# Patient Record
Sex: MALE | Race: WHITE | Employment: FULL TIME | URBAN - NONMETROPOLITAN AREA
[De-identification: names, ages, dates, MRNs, and addresses within clinical notes are randomized per-mention and may not be internally consistent; named-entity substitution may affect disease eponyms.]

---
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
## 2022-05-08 ENCOUNTER — HOSPITAL ENCOUNTER (EMERGENCY)
Age: 22
Discharge: HOME | End: 2022-05-08
Payer: COMMERCIAL

## 2022-05-08 ENCOUNTER — APPOINTMENT (OUTPATIENT)
Dept: CT IMAGING | Age: 22
End: 2022-05-08
Payer: COMMERCIAL

## 2022-05-08 VITALS
DIASTOLIC BLOOD PRESSURE: 76 MMHG | HEART RATE: 58 BPM | OXYGEN SATURATION: 99 % | SYSTOLIC BLOOD PRESSURE: 129 MMHG | WEIGHT: 220 LBS | HEIGHT: 77 IN | TEMPERATURE: 97.7 F | RESPIRATION RATE: 23 BRPM | BODY MASS INDEX: 25.98 KG/M2

## 2022-05-08 DIAGNOSIS — R56.9 SEIZURE (HCC): Primary | ICD-10-CM

## 2022-05-08 LAB
AMPHETAMINE+METHAMPHETAMINE URINE SCREEN: NEGATIVE
ANION GAP SERPL CALCULATED.3IONS-SCNC: 13 MEQ/L (ref 8–16)
BACTERIA: ABNORMAL /HPF
BARBITURATE QUANTITATIVE URINE: NEGATIVE
BASOPHILS # BLD: 0.4 %
BASOPHILS ABSOLUTE: 0 THOU/MM3 (ref 0–0.1)
BENZODIAZEPINE QUANTITATIVE URINE: NEGATIVE
BILIRUBIN URINE: NEGATIVE
BLOOD, URINE: NEGATIVE
BUN BLDV-MCNC: 25 MG/DL (ref 7–22)
CALCIUM SERPL-MCNC: 9.6 MG/DL (ref 8.5–10.5)
CANNABINOID QUANTITATIVE URINE: NEGATIVE
CASTS 2: ABNORMAL /LPF
CASTS UA: ABNORMAL /LPF
CHARACTER, URINE: CLEAR
CHLORIDE BLD-SCNC: 105 MEQ/L (ref 98–111)
CO2: 23 MEQ/L (ref 23–33)
COCAINE METABOLITE QUANTITATIVE URINE: NEGATIVE
COLOR: YELLOW
CREAT SERPL-MCNC: 0.9 MG/DL (ref 0.4–1.2)
CRYSTALS, UA: ABNORMAL
EKG ATRIAL RATE: 56 BPM
EKG P AXIS: 21 DEGREES
EKG P-R INTERVAL: 164 MS
EKG Q-T INTERVAL: 408 MS
EKG QRS DURATION: 104 MS
EKG QTC CALCULATION (BAZETT): 393 MS
EKG R AXIS: 64 DEGREES
EKG T AXIS: 17 DEGREES
EKG VENTRICULAR RATE: 56 BPM
EOSINOPHIL # BLD: 0.9 %
EOSINOPHILS ABSOLUTE: 0 THOU/MM3 (ref 0–0.4)
EPITHELIAL CELLS, UA: ABNORMAL /HPF
ERYTHROCYTE [DISTWIDTH] IN BLOOD BY AUTOMATED COUNT: 12.5 % (ref 11.5–14.5)
ERYTHROCYTE [DISTWIDTH] IN BLOOD BY AUTOMATED COUNT: 41.8 FL (ref 35–45)
ETHYL ALCOHOL, SERUM: < 0.01 %
GLUCOSE BLD-MCNC: 96 MG/DL (ref 70–108)
GLUCOSE URINE: NEGATIVE MG/DL
HCT VFR BLD CALC: 47.3 % (ref 42–52)
HEMOGLOBIN: 15.9 GM/DL (ref 14–18)
IMMATURE GRANS (ABS): 0.03 THOU/MM3 (ref 0–0.07)
IMMATURE GRANULOCYTES: 0.7 %
KETONES, URINE: NEGATIVE
LEUKOCYTE ESTERASE, URINE: NEGATIVE
LYMPHOCYTES # BLD: 46.3 %
LYMPHOCYTES ABSOLUTE: 2.1 THOU/MM3 (ref 1–4.8)
MAGNESIUM: 2 MG/DL (ref 1.6–2.4)
MCH RBC QN AUTO: 30.7 PG (ref 26–33)
MCHC RBC AUTO-ENTMCNC: 33.6 GM/DL (ref 32.2–35.5)
MCV RBC AUTO: 91.3 FL (ref 80–94)
MISCELLANEOUS 2: ABNORMAL
MONOCYTES # BLD: 7.7 %
MONOCYTES ABSOLUTE: 0.4 THOU/MM3 (ref 0.4–1.3)
NITRITE, URINE: NEGATIVE
NUCLEATED RED BLOOD CELLS: 0 /100 WBC
OPIATES, URINE: NEGATIVE
OSMOLALITY CALCULATION: 285.5 MOSMOL/KG (ref 275–300)
OXYCODONE: NEGATIVE
PH UA: 5.5 (ref 5–9)
PHENCYCLIDINE QUANTITATIVE URINE: NEGATIVE
PLATELET # BLD: 183 THOU/MM3 (ref 130–400)
PMV BLD AUTO: 11 FL (ref 9.4–12.4)
POTASSIUM SERPL-SCNC: 4.6 MEQ/L (ref 3.5–5.2)
PROLACTIN: 38.9 NG/ML
PROTEIN UA: ABNORMAL
RBC # BLD: 5.18 MILL/MM3 (ref 4.7–6.1)
RBC URINE: ABNORMAL /HPF
RENAL EPITHELIAL, UA: ABNORMAL
SEG NEUTROPHILS: 44 %
SEGMENTED NEUTROPHILS ABSOLUTE COUNT: 2 THOU/MM3 (ref 1.8–7.7)
SODIUM BLD-SCNC: 141 MEQ/L (ref 135–145)
SPECIFIC GRAVITY, URINE: 1.02 (ref 1–1.03)
TROPONIN T: < 0.01 NG/ML
UROBILINOGEN, URINE: 0.2 EU/DL (ref 0–1)
WBC # BLD: 4.6 THOU/MM3 (ref 4.8–10.8)
WBC UA: ABNORMAL /HPF
YEAST: ABNORMAL

## 2022-05-08 PROCEDURE — 84484 ASSAY OF TROPONIN QUANT: CPT

## 2022-05-08 PROCEDURE — 70450 CT HEAD/BRAIN W/O DYE: CPT

## 2022-05-08 PROCEDURE — 99284 EMERGENCY DEPT VISIT MOD MDM: CPT

## 2022-05-08 PROCEDURE — 80048 BASIC METABOLIC PNL TOTAL CA: CPT

## 2022-05-08 PROCEDURE — 85025 COMPLETE CBC W/AUTO DIFF WBC: CPT

## 2022-05-08 PROCEDURE — 93005 ELECTROCARDIOGRAM TRACING: CPT | Performed by: PHYSICIAN ASSISTANT

## 2022-05-08 PROCEDURE — 83735 ASSAY OF MAGNESIUM: CPT

## 2022-05-08 PROCEDURE — 93010 ELECTROCARDIOGRAM REPORT: CPT | Performed by: INTERNAL MEDICINE

## 2022-05-08 PROCEDURE — 84146 ASSAY OF PROLACTIN: CPT

## 2022-05-08 PROCEDURE — 81001 URINALYSIS AUTO W/SCOPE: CPT

## 2022-05-08 PROCEDURE — 2580000003 HC RX 258: Performed by: PHYSICIAN ASSISTANT

## 2022-05-08 PROCEDURE — 82077 ASSAY SPEC XCP UR&BREATH IA: CPT

## 2022-05-08 PROCEDURE — 80305 DRUG TEST PRSMV DIR OPT OBS: CPT

## 2022-05-08 RX ORDER — 0.9 % SODIUM CHLORIDE 0.9 %
1000 INTRAVENOUS SOLUTION INTRAVENOUS ONCE
Status: COMPLETED | OUTPATIENT
Start: 2022-05-08 | End: 2022-05-08

## 2022-05-08 RX ADMIN — SODIUM CHLORIDE 1000 ML: 9 INJECTION, SOLUTION INTRAVENOUS at 07:32

## 2022-05-08 ASSESSMENT — PAIN SCALES - GENERAL
PAINLEVEL_OUTOF10: 4

## 2022-05-08 ASSESSMENT — PAIN DESCRIPTION - LOCATION
LOCATION: SHOULDER

## 2022-05-08 ASSESSMENT — ENCOUNTER SYMPTOMS
DIARRHEA: 0
BLOOD IN STOOL: 0
COUGH: 0
SORE THROAT: 0
SHORTNESS OF BREATH: 0
EYE PAIN: 0
NAUSEA: 0
SINUS PAIN: 0
ABDOMINAL DISTENTION: 0
CONSTIPATION: 0
SINUS PRESSURE: 0
ABDOMINAL PAIN: 0
VOMITING: 0

## 2022-05-08 ASSESSMENT — PAIN - FUNCTIONAL ASSESSMENT
PAIN_FUNCTIONAL_ASSESSMENT: 0-10

## 2022-05-08 ASSESSMENT — PAIN DESCRIPTION - ORIENTATION
ORIENTATION: RIGHT

## 2022-05-08 NOTE — ED PROVIDER NOTES
[facility] 22 COMPLAINT       Chief Complaint   Patient presents with    Seizures       Nurses Notes reviewed and I agree except as notedin the HPI. HISTORY OF PRESENT ILLNESS    Maribel Fang is a 25 y.o. male who presents mother heard a noise in the bathroom this morning and went to go check on the patient was on the ground seizing for actually a minute. The patient did have a postictal state for approximately 15 minutes afterwards. The patient never had a seizure before. The patient has had no recent illnesses. The patient is essentially without complaints. No headache. Location/Symptom: Seizure  Timing/Onset: just PTA  Context/Setting: home  Quality: none  Duration: 1 min  Modifying Factors: none  Severity: none    REVIEW OF SYSTEMS     Review of Systems   Constitutional: Negative for chills and fever. HENT: Negative for congestion, ear pain, sinus pressure, sinus pain and sore throat. Eyes: Negative for pain. Respiratory: Negative for cough and shortness of breath. Cardiovascular: Negative for chest pain and leg swelling. Gastrointestinal: Negative for abdominal distention, abdominal pain, blood in stool, constipation, diarrhea, nausea and vomiting. Genitourinary: Negative for difficulty urinating, frequency and hematuria. Musculoskeletal: Negative for arthralgias. Skin: Negative for rash. Neurological: Positive for seizures. Negative for dizziness and headaches. All other systems reviewed and are negative. PAST MEDICAL HISTORY    has no past medical history on file. SURGICAL HISTORY      has a past surgical history that includes shoulder surgery (Left). CURRENT MEDICATIONS       There are no discharge medications for this patient. ALLERGIES     has No Known Allergies. HISTORY     has no family status information on file. family history is not on file.     SOCIALHISTORY      reports that he has never smoked. He has never used smokeless tobacco. He reports that he does not drink alcohol and does not use drugs. PHYSICAL EXAM     INITIAL VITALS:  height is 6' 5\" (1.956 m) and weight is 220 lb (99.8 kg). His oral temperature is 97.7 °F (36.5 °C). His blood pressure is 129/76 and his pulse is 58. His respiration is 23 and oxygen saturation is 99%. Physical Exam  Vitals and nursing note reviewed. HENT:      Head: Normocephalic and atraumatic. Eyes:      Pupils: Pupils are equal, round, and reactive to light. Neck:      Thyroid: No thyromegaly. Trachea: No tracheal deviation. Cardiovascular:      Rate and Rhythm: Normal rate and regular rhythm. Heart sounds: No murmur heard. No friction rub. Pulmonary:      Effort: Pulmonary effort is normal. No respiratory distress. Breath sounds: Normal breath sounds. No wheezing. Abdominal:      General: Bowel sounds are normal. There is no distension. Palpations: Abdomen is soft. Tenderness: There is no abdominal tenderness. Musculoskeletal:      Cervical back: Neck supple. Skin:     General: Skin is warm and dry. Findings: No erythema or rash. Neurological:      Mental Status: He is alert and oriented to person, place, and time. DIFFERENTIAL DIAGNOSIS:   Seizure disorder possibly versus syncope. DIAGNOSTIC RESULTS     EKG: All EKG's are interpreted by the Emergency Department Physician who either signs or Co-signs this chart in the absence of a cardiologist.      RADIOLOGY: non-plain film images(s) such as CT, Ultrasound and MRI are read by the radiologist.  [address] [place] [address]   Final Result      No mass effect or acute hemorrhage. **This report has been created using voice recognition software. It may contain minor errors which are inherent in voice recognition technology. **      Final report electronically signed by Dr. Haley Cordero on 5/8/2022 7:53 AM            LABS:   Labs Reviewed CBC WITH AUTO DIFFERENTIAL - Abnormal; Notable for the following components:       Result Value    WBC 4.6 (*)     All other components within normal limits   BASIC METABOLIC PANEL - Abnormal; Notable for the following components:    BUN 25 (*)     All other components within normal limits   URINE WITH REFLEXED MICRO - Abnormal; Notable for the following components:    Protein, UA TRACE (*)     All other components within normal limits   MAGNESIUM   TROPONIN   PROLACTIN   ETHANOL   RAPID DRUG SCREEN, URINE   ANION GAP   OSMOLALITY       EMERGENCY DEPARTMENT COURSE:   :    Vitals:    05/08/22 0658 05/08/22 0715 05/08/22 0815 05/08/22 0930   BP:  (!) 141/74 122/69 129/76   Pulse:  57 (!) 49 58   Resp:  16 16 23   Temp: 97.7 °F (36.5 °C)      TempSrc: Oral      SpO2:  100% 97% 99%   Weight:       Height:         Patient was seen history physical exam was performed. Patient given liter normal saline. Patient was reassessed after has had no further seizure activity. The patient's sister is a emergency room nurse that works here. She described a postictal state after this happened. I do really do feel this was a seizure. This point we will have her follow-up with neurology have not seen from driving or any activities if he had a momentary loss of consciousness that would be dangerous. See disposition below    CRITICAL CARE:  None    CONSULTS:  None    PROCEDURES:  None    FINAL IMPRESSION      1. Seizure [facility])          DISPOSITION/PLAN   Discharge    PATIENT REFERRED TO:  [facility]  [address] [address] [place] [address],[address]  [phone]    In 2 days      Johan Anthony, [address] [phone]    In 2 days        DISCHARGE MEDICATIONS:  There are no discharge medications for this patient.       (Please note that portions of this note were completed with a voice recognitionprogram.  Efforts were made to edit the dictations but occasionally words are mis-transcribed.)    ROMI Andersen, [address]  05/08/22 1037

## 2022-05-08 NOTE — ED NOTES
Patient presents to ED via EMS for seizures. Mother at bedside states that she had heard a 'thud' in the bathroom and went to check on pt and he was on the ground seizing in the bathroom for approx less than a minute. Per EMS, patient was very post ictal en route. While in ED, patient seems more alert and is answering questions appropriately and joking around with staff. Mother at bedside. Labs collected. AC Mckenna  05/08/22 0703

## 2022-05-08 NOTE — ED NOTES
ED nurse-to-nurse bedside report    Chief Complaint   Patient presents with    Seizures      LOC: alert and orientated to name, place, date  Vital signs   Vitals:    05/08/22 0656 05/08/22 0658   BP: (!) 141/74    Pulse: 65    Resp: 16    Temp:  97.7 °F (36.5 °C)   TempSrc:  Oral   SpO2: 100%    Weight: 220 lb (99.8 kg)    Height: 6' 5\" (1.956 m)       Pain:    Pain Interventions: none  Pain Goal: none  Oxygen: No    Current needs required none   Telemetry: No  LDAs:    Continuous Infusions:   Mobility: Requires assistance * 1  Hernadez Fall Risk Score: No flowsheet data found. Fall Interventions: siderails, call light, family at bedside. Report given to: Jenae Rinaldi.        Xiomara Ruff RN  05/08/22 0038

## 2022-05-08 NOTE — ED NOTES
Patient resting on cart with room lights dimmed, family at bedside. Patient alert, states he is drowsy. Patient updated on plan of care. Respirations unlabored. Call light in reach.      Wolf Abebe RN  05/08/22 7266

## 2022-05-24 ENCOUNTER — INITIAL CONSULT (OUTPATIENT)
Dept: NEUROLOGY | Age: 22
End: 2022-05-24
Payer: COMMERCIAL

## 2022-05-24 VITALS
BODY MASS INDEX: 25.98 KG/M2 | WEIGHT: 220 LBS | HEIGHT: 77 IN | HEART RATE: 56 BPM | DIASTOLIC BLOOD PRESSURE: 60 MMHG | SYSTOLIC BLOOD PRESSURE: 110 MMHG | OXYGEN SATURATION: 99 %

## 2022-05-24 DIAGNOSIS — R56.9 PROVOKED SEIZURE (HCC): Primary | ICD-10-CM

## 2022-05-24 PROCEDURE — G8428 CUR MEDS NOT DOCUMENT: HCPCS | Performed by: PSYCHIATRY & NEUROLOGY

## 2022-05-24 PROCEDURE — 99244 OFF/OP CNSLTJ NEW/EST MOD 40: CPT | Performed by: PSYCHIATRY & NEUROLOGY

## 2022-05-24 PROCEDURE — G8419 CALC BMI OUT NRM PARAM NOF/U: HCPCS | Performed by: PSYCHIATRY & NEUROLOGY

## 2022-05-24 NOTE — PATIENT INSTRUCTIONS
1. MRI brain WO contrast  2. EEG  3. Tilt table test  4. You need to avoid alcohol as discussed  5. No driving, swimming, operating heavy machinery or compromising heights until cleared. Report any new events. Call if any questions. 6. Call with any new symptoms or concerns. 7. Follow up in 6 weeks.

## 2022-05-28 ENCOUNTER — HOSPITAL ENCOUNTER (OUTPATIENT)
Dept: MRI IMAGING | Age: 22
Discharge: HOME | End: 2022-05-28
Payer: COMMERCIAL

## 2022-05-28 DIAGNOSIS — R56.9 PROVOKED SEIZURE (HCC): ICD-10-CM

## 2022-05-28 PROCEDURE — 70551 MRI BRAIN STEM W/O DYE: CPT

## 2022-05-31 ENCOUNTER — TELEPHONE (OUTPATIENT)
Dept: NEUROLOGY | Age: 22
End: 2022-05-31

## 2022-05-31 NOTE — TELEPHONE ENCOUNTER
----- Message from CRISTOPHER Flores - CNP sent at 5/31/2022 12:08 PM EDT -----  These let patient know his MRI brain showed no concerning findings.   Elvia Farmer CNP

## 2022-05-31 NOTE — PROGRESS NOTES
Chief Complaint   Patient presents with    Consultation     seizures       Son Durán is a 25 y.o. male who presents today for evaluation of episode of seizure like activity in May 2022. He had woken up in the morning at 6am. He woke up and did not feel right, felt a bit foggy. He not get much sleep the night before and did not drink much water that week. He did have 4-5 alcoholic drinks the night prior. He was getting ready and brushing his teeth when he began feeling dizzy and had trouble focusing. He was found by his mother to be on the floor to be actively convulsing. No tongue biting, no bowel or bladder incontinence. He was tired the rest of the day and sleep. He denies any previous history of seizure in the past. He has passed out in the past as a child while in the shower. Family history is significant for seizure in his maternal and paternal grandmothers. CT head done 5/8/22 showed No mass effect or acute hemorrhage. He was asked not to drive, he is not driving. No illicit drug use. She denies chest pain. No shortness of breath, no neck pain. No vision changes. No dysphagia. No fever. No rash. No weight loss. History provided by patient accompanied by his mother. Past Medical History:   Diagnosis Date    Seizures ([address]        There is no problem list on file for this patient. No Known Allergies    No current outpatient medications on file. No current facility-administered medications for this visit.        Social History     Socioeconomic History    Marital status: Single     Spouse name: None    Number of children: None    Years of education: None    Highest education level: None   Occupational History    None   Tobacco Use    Smoking status: Never Smoker    Smokeless tobacco: Never Used   Vaping Use    Vaping Use: Never used   Substance and Sexual Activity    Alcohol use: No     Comment: occasional     Drug use: No    Sexual activity: Not Currently   Other Topics Concern    None   Social History Narrative    None     Social Determinants of Health     Financial Resource Strain:     Difficulty of Paying Living Expenses: Not on file   Food Insecurity:     Worried About Running Out of Food in the Last Year: Not on file    [place] of Food in the Last Year: Not on file   Transportation Needs:     Lack of Transportation (Medical): Not on file    Lack of Transportation (Non-Medical): Not on file   Physical Activity:     Days of Exercise per Week: Not on file    Minutes of Exercise per Session: Not on file   Stress:     Feeling of Stress : Not on file   Social Connections:     Frequency of Communication with Friends and Family: Not on file    Frequency of Social Gatherings with Friends and Family: Not on file    Attends Quaker Services: Not on file    Active Member of [address] [place] [address] [employer] or Organizations: Not on file    Attends Club or Organization Meetings: Not on file    Marital Status: Not on file   Intimate Partner Violence:     Fear of Current or Ex-Partner: Not on file    Emotionally Abused: Not on file    Physically Abused: Not on file    Sexually Abused: Not on file   Housing Stability:     Unable to Pay for Housing in the Last Year: Not on file    Number of [place] in the Last Year: Not on file    Unstable Housing in the Last Year: Not on file       History reviewed. No pertinent family history. I reviewed the past medical history, allergies, medications, social history and family history.    Review of Systems   All systems reviewed, and are all negative, except what is mentioned in HPI      Vitals:    05/24/22 1450   BP: 110/60   Site: Right Upper Arm   Position: Sitting   Cuff Size: Medium Adult   Pulse: 56   SpO2: 99%   Weight: 220 lb (99.8 kg)   Height: 6' 5\" (1.956 m)       Physical Examination:  General appearance - alert, well appearing, and in no distress, oriented to person, place, and time and normal weight  Mental status- Level of Alertness: awake  Orientation: person, place, time  Memory: normal  Fund of Knowledge: normal  Attention/Concentration: normal  Language: normal. Mood is normal.   Neck - supple, no significant adenopathy, carotids upstroke normal bilaterally. There is no axillary lymphadenopathy. There is no carotid bruit. No neck lymphadenopathy. No thyroid enlargement   Neurological -   Cranial Wvtizs-XR-QJT:.   Cranial nerve II: Normal   Cranial nerve III: Pupils: equal, round, reactive to light  Cranial nerves III, IV, VI: Extraocular Movements: intact   Cranial nerve V: Facial sensation: intact   Cranial nerve VII:Facial strength: intact   Cranial nerve VIII: Hearing: intact   Cranial nerve IX: Palate Elevation intact bilaterally  Cranial nerve XI: Shoulder shrug intact bilaterally  Cranial nerve XII: Tongue midline   neck supple without rigidity, there is no limitation of range of motion of the neck. DTR's are Intact distal and symmetric  Babinski sign negative  Motor exam is 5/5 in the upper and lower extremities. Normal muscle tone. There is no muscle atrophy. Sensory is intact for light touch. Coordination: finger to nose,  intact  Gait and station intact  Abnormal movement none, vibration normal, proprioception normal  Skin - warm, dry to touch, normal coloration, no rashes, no suspicious skin lesions  Superficial temporal artery pulses are normal.   There is no limitation of range of motion of the neck. There is no resting tremor, no pin rolling, no bradykinesia, no Hypohonia, normal blink rate. Musculoskeletal: Has no hand arthritis, no limitation of ROM in any of the four extremities. There is no leg edema. The Heart was regular in rate and rhythm. No heart murmur  Chest Clear, with  good effort. Abdomen soft, intact bowel sounds.        Results for orders placed during the hospital encounter of 05/08/22    CT HEAD WO CONTRAST    Narrative  PROCEDURE: CT HEAD WO CONTRAST    CLINICAL INFORMATION: with interpretation. His exam is nonfocal. The patient was counseled about his symptoms and work up recommended. We will arrange for him to undergo an MRI of the brain without contrast to evaluate for organic causes of his symptoms as well as an EEG to screen for cortical irritability. We will also order a tilt table test to evaluate for orthostatic hypotension/vasovagal syncope. He was counseled on the importance of avoiding alcohol as well as refraining from driving, swimming, operating heavy machinery, or compromising heights until cleared. After detailed discussion with the patient and his mother we agreed on the following plan. Plan    1. MRI brain WO contrast  2. EEG  3. Tilt table test  4. You need to avoid alcohol as discussed  5. No driving, swimming, operating heavy machinery or compromising heights until cleared. Report any new events. Call if any questions. 6. Call with any new symptoms or concerns. 7. Follow up in 6 weeks.        Total time 65 min        Alka Christian MD

## 2022-06-07 ENCOUNTER — HOSPITAL ENCOUNTER (OUTPATIENT)
Dept: NEUROLOGY | Age: 22
Discharge: HOME | End: 2022-06-07
Payer: COMMERCIAL

## 2022-06-07 ENCOUNTER — HOSPITAL ENCOUNTER (OUTPATIENT)
Dept: NON INVASIVE DIAGNOSTICS | Age: 22
Discharge: HOME | End: 2022-06-07
Payer: COMMERCIAL

## 2022-06-07 DIAGNOSIS — R56.9 PROVOKED SEIZURE (HCC): ICD-10-CM

## 2022-06-07 PROCEDURE — 95816 EEG AWAKE AND DROWSY: CPT

## 2022-06-07 PROCEDURE — 93660 TILT TABLE EVALUATION: CPT | Performed by: INTERNAL MEDICINE

## 2022-06-07 PROCEDURE — 95819 EEG AWAKE AND ASLEEP: CPT | Performed by: PSYCHIATRY & NEUROLOGY

## 2022-06-07 NOTE — PROGRESS NOTES
[facility] Laboratory Technician worksheet       EEG Date: 2022    Name: Carmelina Dsouza   : 2000   Age: 23 y.o. SEX: male    Room: op    MRN: [id number]     CSN: [id number]    Ordering Provider: Christy Chung  EEG Number: [id number] Time of Test:  10:31    Hand: Left   Sedation: No    H.V. Done: Yes with good effort Photic: Yes    Sleep: Yes   Drowsy: Yes   Sleep Deprived: No    Seizures observed: no    Mentality: alert       Clinical History: history of headaches, seizures with convulsions, found unresponsive, CT of the head 2022    Impression       No mass effect or acute hemorrhage.       MRI done 2022  Impression       1. Negative MRI scan of the brain, no structural abnormality noted in the temporal lobes.    2. Minimal mucosal thickening in ethmoid air cells bilaterally       Past Medical History:       Diagnosis Date    Seizures [facility])          Prior to Admission medications    Not on File       Technician: Kassandra Castaneda 2022

## 2022-06-07 NOTE — PROCEDURES
[address] [place] [address]                                TILT TABLE TEST    PATIENT NAME: Jac Dowling                  :        2000  MED REC NO:   [id number]                           ROOM:  ACCOUNT NO:   [de-identified]                           ADMIT DATE: 2022  PROVIDER:     Saul Tello MD    DATE OF STUDY:  2022    PROCEDURE PERFORMED:  Tilt table study. INDICATION FOR STUDY:  Syncopal episode. DESCRIPTION OF PROCEDURE:  After informed consent was obtained, the  patient was brought to the electrophysiology laboratory in a fasting,  nonsedated state. His resting blood pressure was 125/61 with a heart  rate of 56 beats per minute. Under continuous blood pressure, EKG and  heart rate monitoring, his bed was tilted to about 70 degrees. Immediately after tilt, his blood pressure was 123/70 with a heart rate  of 78 beats per minute. During the next 10 minutes, his blood pressure  and heart rate remained stable. At 10 minutes of tilt table study, his  blood pressure was 112/60 with a heart rate of 74 beats per minute. At  20 minutes of tilt table study, his blood pressure was 125/62 with a  heart rate of 74 beats per minute and at 30 minutes of tilt table study,  his blood pressure was 112/64 with the heart rate of 84 beats per  minute. During the entire 30 minutes of tilt table study, the patient  did not have any symptoms. SUMMARY:  This is a negative tilt table study for vasodepressive or  cardioinhibitory symptoms. Clinical correlation is necessary.         Bianca Hodgson MD    D: 2022 13:43:26       T: 2022 14:21:18     [id number]  Job#: [id number]     Doc#: [id number]    CC:

## 2022-06-09 ENCOUNTER — TELEPHONE (OUTPATIENT)
Dept: NEUROLOGY | Age: 22
End: 2022-06-09

## 2022-06-09 NOTE — PROCEDURES
[address] [place] [address]                          ELECTROENCEPHALOGRAM REPORT    PATIENT NAME: Remington Spann                  :        2000  MED REC NO:   [id number]                           ROOM:  ACCOUNT NO:   [de-identified]                           ADMIT DATE: 2022  PROVIDER:     Denise Schuster. Purvi Perera MD    DATE OF EE2022    REFERRING PROVIDER:  Katherine Owens CNP    CLINICAL HISTORY:  A 82-year-old male presenting with headache, seizure  with convulsion, found unresponsive. MRI of brain shows no acute  cranial process. Past medical history is significant for seizure. Medications listed none. CLINICAL INTERPRETATION:  This is a routine 20-minute EEG recording  using the international 10/20 system on a [employer] workstation. Automated  spike and seizure detection algorithms were applied. The patient is  described as alert. The background rhythm activity is noted to be 10 Hz in the posterior  parietal area, symmetric, well modulated, attenuates with eye opening. Hyperventilation was performed for 3 minutes with fair effort without  abnormality. The patient is noted to be drowsy during parts of  recording. The patient is noted to be asleep during parts of recording. EKG tracing revealed regular heart rate. Occasional left frontal sharp  waves were noted during the recording that are of questionable  significance, though they may be epileptogenic in nature or indicate  seizure tendency. No clinical seizures were observed. IMPRESSION:  This is an abnormal EEG due to the presence of occasional  left frontal sharp waves during the recording that are of questionable  significance, though they may be epileptogenic in nature or indicate  seizure tendency in the proper clinical context. No clinical seizures  were observed. Clinical correlation is recommended.         Jvoani Steiner MD    D: 2022 13:44:05       T: 06/09/2022 13:46:43     [email or web address]  Job#: [id number]     Doc#: [id number]    CC:

## 2022-06-13 ENCOUNTER — OFFICE VISIT (OUTPATIENT)
Dept: NEUROLOGY | Age: 22
End: 2022-06-13
Payer: COMMERCIAL

## 2022-06-13 VITALS
WEIGHT: 217 LBS | DIASTOLIC BLOOD PRESSURE: 60 MMHG | HEART RATE: 62 BPM | BODY MASS INDEX: 25.62 KG/M2 | SYSTOLIC BLOOD PRESSURE: 118 MMHG | RESPIRATION RATE: 98 BRPM | HEIGHT: 77 IN

## 2022-06-13 DIAGNOSIS — R56.9 PROVOKED SEIZURE (HCC): Primary | ICD-10-CM

## 2022-06-13 PROCEDURE — G8419 CALC BMI OUT NRM PARAM NOF/U: HCPCS | Performed by: NURSE PRACTITIONER

## 2022-06-13 PROCEDURE — 99213 OFFICE O/P EST LOW 20 MIN: CPT | Performed by: NURSE PRACTITIONER

## 2022-06-13 PROCEDURE — 1036F TOBACCO NON-USER: CPT | Performed by: NURSE PRACTITIONER

## 2022-06-13 PROCEDURE — G8427 DOCREV CUR MEDS BY ELIG CLIN: HCPCS | Performed by: NURSE PRACTITIONER

## 2022-06-13 RX ORDER — DIVALPROEX SODIUM 500 MG/1
500 TABLET, EXTENDED RELEASE ORAL DAILY
Qty: 30 TABLET | Refills: 3 | Status: SHIPPED | OUTPATIENT
Start: 2022-06-13

## 2022-06-13 NOTE — PATIENT INSTRUCTIONS
1. Start Depakote  mg nightly  2. Depakote level in 1 week   3. You need to avoid alcohol as discussed  4. No driving, swimming, operating heavy machinery or compromising heights until cleared. Report any new events. Call if any questions. 5. Call with any new symptoms or concerns. 6. Follow up in 8 weeks.

## 2022-06-13 NOTE — PROGRESS NOTES
NEUROLOGY OUT PATIENT FOLLOW UP NOTE:  6/13/20228:55 AM    Darshan Jaimes is here for follow up for single seizure. ROS:  Respiratory : no cough, no shortness of breath  Cardiac: no chest pain. No palpitations. Renal : no flank pain, no hematuria    Skin: no rash      No Known Allergies  No current outpatient medications on file. I reviewed the past medical history, allergies, medications, social history and family history. PE:   Vitals:    06/13/22 0844   BP: 118/60   Site: Left Upper Arm   Position: Sitting   Cuff Size: Medium Adult   Pulse: 62   Resp: (!) 98   Weight: 217 lb (98.4 kg)   Height: 6' 5\" (1.956 m)     General Appearance:  awake, alert, oriented, in no acute distress  Gen: NAD, Language is Intact. Skin: no rash, lesion, dry  to touch. warm  Head: no rash, no icterus  Neck: There is no carotid bruits. The Neck is supple. There is no neck lymphadenopathy. Neuro: CN 2-12 grossly intact with no focal deficits. Power 5/5 Throughout symmetric, Reflexes are +2 symmetric. Long tracts are intact. Cerebellar exam is Intact. Sensory exam is intact to light touch. Gait is intact. Musculoskeletal:  Has no hand arthritis, no limitation of ROM in any of the four extremities.   Lower extremities no edema          DATA:      Results for orders placed or performed during the hospital encounter of 05/08/22   CBC with Auto Differential   Result Value Ref Range    WBC 4.6 (L) 4.8 - 10.8 thou/mm3    RBC 5.18 4.70 - 6.10 mill/mm3    Hemoglobin 15.9 14.0 - 18.0 gm/dl    Hematocrit 47.3 42.0 - 52.0 %    MCV 91.3 80.0 - 94.0 fL    MCH 30.7 26.0 - 33.0 pg    MCHC 33.6 32.2 - 35.5 gm/dl    RDW-CV 12.5 11.5 - 14.5 %    RDW-SD 41.8 35.0 - 45.0 fL    Platelets 219 033 - 984 thou/mm3    MPV 11.0 9.4 - 12.4 fL    Seg Neutrophils 44.0 %    Lymphocytes 46.3 %    Monocytes 7.7 %    Eosinophils 0.9 %    Basophils 0.4 %    Immature Granulocytes 0.7 %    Segs Absolute 2.0 1.8 - 7.7 thou/mm3    Lymphocytes Absolute 2.1 1.0 - 4.8 thou/mm3    Monocytes Absolute 0.4 0.4 - 1.3 thou/mm3    Eosinophils Absolute 0.0 0.0 - 0.4 thou/mm3    Basophils Absolute 0.0 0.0 - 0.1 thou/mm3    Immature Grans (Abs) 0.03 0.00 - 0.07 thou/mm3    nRBC 0 /100 wbc   Basic Metabolic Panel   Result Value Ref Range    Sodium 141 135 - 145 meq/L    Potassium 4.6 3.5 - 5.2 meq/L    Chloride 105 98 - 111 meq/L    CO2 23 23 - 33 meq/L    Glucose 96 70 - 108 mg/dL    BUN 25 (H) 7 - 22 mg/dL    CREATININE 0.9 0.4 - 1.2 mg/dL    Calcium 9.6 8.5 - 10.5 mg/dL   Magnesium   Result Value Ref Range    Magnesium 2.0 1.6 - 2.4 mg/dL   Troponin   Result Value Ref Range    Troponin T < 0.010 ng/ml   Prolactin   Result Value Ref Range    Prolactin 38.9 ng/ml   Ethanol   Result Value Ref Range    ETHYL ALCOHOL, SERUM < 0.01 0.00 %   Rapid drug screen, urine   Result Value Ref Range    AMPHETAMINE+METHAMPHETAMINE URINE SCREEN negative NEGATIVE    Barbiturate Quant, Ur negative NEGATIVE    Benzodiazepine Quant, Ur negative NEGATIVE    Cannabinoid Quant, Ur negative NEGATIVE    Cocaine Metab Quant, Ur negative NEGATIVE    Opiates, Urine negative NEGATIVE    PCP Quant, Ur negative NEGATIVE    Oxycodone negative NEGATIVE   Urine with Reflexed Micro   Result Value Ref Range    Glucose, Ur NEGATIVE NEGATIVE mg/dl    Bilirubin Urine NEGATIVE NEGATIVE    Ketones, Urine NEGATIVE NEGATIVE    Specific Gravity, Urine 1.023 1.002 - 1.030    Blood, Urine NEGATIVE NEGATIVE    pH, UA 5.5 5.0 - 9.0    Protein, UA TRACE (A) NEGATIVE    Urobilinogen, Urine 0.2 0.0 - 1.0 eu/dl    Nitrite, Urine NEGATIVE NEGATIVE    Leukocyte Esterase, Urine NEGATIVE NEGATIVE    Color, UA YELLOW STRAW-YELLOW    Character, Urine CLEAR CLEAR-SL CLOUD    RBC, UA NONE SEEN 0-2/hpf /hpf    WBC, UA 0-2 0-4/hpf /hpf    Epithelial Cells, UA NONE SEEN 3-5/hpf /hpf    Bacteria, UA NONE SEEN FEW/NONE SEEN /hpf    Casts UA NONE SEEN NONE SEEN /lpf    Crystals, UA NONE SEEN NONE SEEN    Renal Epithelial, UA NONE SEEN NONE SEEN Yeast, UA NONE SEEN NONE SEEN    CASTS 2 NONE SEEN NONE SEEN /lpf    MISCELLANEOUS 2 NONE SEEN    Anion Gap   Result Value Ref Range    Anion Gap 13.0 8.0 - 16.0 meq/L   Osmolality   Result Value Ref Range    Osmolality Calc 285.5 275.0 - 300.0 mOsmol/kg   EKG Syncope   Result Value Ref Range    Ventricular Rate 56 BPM    Atrial Rate 56 BPM    P-R Interval 164 ms    QRS Duration 104 ms    Q-T Interval 408 ms    QTc Calculation (Bazett) 393 ms    P Axis 21 degrees    R Axis 64 degrees    T Axis 17 degrees          No results found for this or any previous visit. No results found for this or any previous visit. No results found for this or any previous visit. No results found for this or any previous visit. Results for orders placed during the hospital encounter of 05/28/22    MRI BRAIN WO CONTRAST    Narrative  PROCEDURE: MRI BRAIN WO CONTRAST    CLINICAL INFORMATION Provoked seizure ([facility] [address] COMPARISON: CT scan of the brain dated 8 May 2022. .    TECHNIQUE: Multiplanar and multiple spin echo MRI images were obtained of the brain without contrast.    FINDINGS:      The diffusion-weighted images are normal.  The brain volume is normal. There is no definite structural abnormality noted in the temporal lobes. There are no intra-or extra-axial collections. There is no hydrocephalus, midline shift or mass effect. There are  no areas of susceptibility artifact noted The major intracranial vascular flow voids are present. The midline craniocervical junction structures are normal.  The pituitary gland and brainstem are normal.    There is minimal mucosal thickening in the ethmoid air cells bilaterally. Impression  1. Negative MRI scan of the brain, no structural abnormality noted in the temporal lobes. 2. Minimal mucosal thickening in ethmoid air cells bilaterally. **This report has been created using voice recognition software.  It may contain minor errors which are inherent in voice recognition technology. **    Final report electronically signed by DR Lo Dawn on 5/28/2022 9:45 AM    No results found for this or any previous visit. No results found for this or any previous visit. Results for orders placed during the hospital encounter of 05/08/22    CT HEAD WO CONTRAST    Narrative  PROCEDURE: CT HEAD WO CONTRAST    CLINICAL INFORMATION: Seizure    TECHNIQUE: CT scan of the head was performed from the vertex to the skull base without use of intravenous contrast. Axial images as well as coronal and sagittal reconstructions were obtained. All CT scans at this facility use dose modulation, iterative reconstruction, and/or weight-based dosing when appropriate to reduce radiation dose to as low as reasonably achievable. COMPARISON: None. FINDINGS: There is no mass effect, midline shift or acute hemorrhage. Ventricles and CSF spaces are within normal limits. Gray-white matter differentiation is preserved. Visualized orbits, paranasal sinuses and mastoid air cells are unremarkable. Impression  No mass effect or acute hemorrhage. **This report has been created using voice recognition software. It may contain minor errors which are inherent in voice recognition technology. **    Final report electronically signed by Dr. Colleen Heart on 5/8/2022 7:53 AM     Tilt table test done 6/7/22:  SUMMARY:  This is a negative tilt table study for vasodepressive or  cardioinhibitory symptoms.     Clinical correlation is necessary.           Neno Rojas MD    Assessment:     Diagnosis Orders   1. Provoked seizure ([address]          Follow up for single seizure. He denies any more spells. No episodes of staring or confusion. I shared with the patient and his mother that his MRI brain and tilt table test showed no concerning findings.  He had an EEG done that was abnormal showing occasional left frontal sharp waves during the recording that are of questionable  significance, though they may be epileptogenic in nature or indicate seizure tendency in the proper clinical context. No clinical seizures were observed. We shared with the patient that he likely has tendency for seizure and his single seizure that he had was provoked by lack of sleep and alcohol intake. We discussed the options of placing him on an antiseizure medication versus not placing him on any antiseizure medication as this is his first event. After discussion with the patient and his mother, he is agreeable to start on an antiseizure medication. We will start him on Depakote  mg nightly and ask him to have Depakote level drawn in 1 week. He was counseled on the importance of avoiding alcohol as this can lower  seizure threshold. He was asked to continue refraining from driving, swimming, operating heavy machinery, or compromising heights until cleared. The patient and his mother asked questions reflecting understanding and agree with the following plan. Plan:  1. Start Depakote  mg nightly  2. Depakote level in 1 week   3. You need to avoid alcohol as discussed  4. No driving, swimming, operating heavy machinery or compromising heights until cleared. Report any new events. Call if any questions. 5. Call with any new symptoms or concerns. 6. Follow up in 8 weeks.        Total time 26 min    CRISTOPHER Kaufman - CNP

## 2024-05-15 NOTE — ED PROVIDER NOTES
care time on this case: None    Electronically signed by Alyson Lynn MD on 5/20/21 at 2:49 PM EDT       Alyson Lynn MD  05/20/21 0848 Yes